# Patient Record
Sex: MALE | Race: WHITE | Employment: PART TIME | URBAN - METROPOLITAN AREA
[De-identification: names, ages, dates, MRNs, and addresses within clinical notes are randomized per-mention and may not be internally consistent; named-entity substitution may affect disease eponyms.]

---

## 2020-11-14 ENCOUNTER — HOSPITAL ENCOUNTER (EMERGENCY)
Age: 20
Discharge: HOME | End: 2020-11-14
Attending: EMERGENCY MEDICINE
Payer: COMMERCIAL

## 2020-11-14 VITALS
DIASTOLIC BLOOD PRESSURE: 96 MMHG | SYSTOLIC BLOOD PRESSURE: 144 MMHG | HEIGHT: 71 IN | BODY MASS INDEX: 19.6 KG/M2 | HEART RATE: 79 BPM | OXYGEN SATURATION: 98 % | WEIGHT: 140 LBS | TEMPERATURE: 97.3 F | RESPIRATION RATE: 14 BRPM

## 2020-11-14 LAB
GLUCOSE BLD-MCNC: 99 MG/DL
GLUCOSE BLD-MCNC: 99 MG/DL (ref 70–99)

## 2020-11-14 PROCEDURE — 93005 ELECTROCARDIOGRAM TRACING: CPT | Performed by: EMERGENCY MEDICINE

## 2020-11-14 PROCEDURE — 82962 GLUCOSE BLOOD TEST: CPT

## 2020-11-14 PROCEDURE — 6370000000 HC RX 637 (ALT 250 FOR IP): Performed by: EMERGENCY MEDICINE

## 2020-11-14 PROCEDURE — 99284 EMERGENCY DEPT VISIT MOD MDM: CPT

## 2020-11-14 RX ORDER — MECLIZINE HYDROCHLORIDE 25 MG/1
25 TABLET ORAL 3 TIMES DAILY PRN
Qty: 15 TABLET | Refills: 0 | Status: SHIPPED | OUTPATIENT
Start: 2020-11-14 | End: 2020-11-24

## 2020-11-14 RX ORDER — MECLIZINE HCL 12.5 MG/1
25 TABLET ORAL ONCE
Status: COMPLETED | OUTPATIENT
Start: 2020-11-14 | End: 2020-11-14

## 2020-11-14 RX ADMIN — MECLIZINE 25 MG: 12.5 TABLET ORAL at 14:27

## 2020-11-14 ASSESSMENT — PAIN SCALES - GENERAL: PAINLEVEL_OUTOF10: 0

## 2020-11-14 NOTE — ED PROVIDER NOTES
Triage Chief Complaint:   Dizziness    [place]:  Sangita Kim is a 23 y.o. male that presents with dizziness. Patient was in baseline state of health until this morning when he woke up getting out of bed and began to feel dizzy. Dizziness is described as a spinning sensation of the room around him. Sensation was initially severe and slowly improved. Dizziness now is only present with abrupt turns of patient's head and improved with resting or holding still. Patient does have a headache. No numbness or weakness. No difficulty with vision. No difficulty with speech or understanding. No recent illnesses. This is the first time this is ever happened. Patient has not taking anything for his symptoms yet. 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 No new hearing difficulties. ROS:  General:  No fevers, no chills, no weakness  Eyes:  No recent vison changes, no discharge  ENT:  No sore throat, no nasal congestion, no hearing changes  Cardiovascular:  No chest pain, no palpitations  Respiratory:  No shortness of breath, no cough, no wheezing  Gastrointestinal:  No pain, no nausea, no vomiting, no diarrhea  Musculoskeletal:  No muscle pain, no joint pain  Skin:  No rash, no pruritis, no easy bruising  Neurologic:  No speech problems, no headache, no extremity numbness, no extremity tingling, no extremity weakness, + dizziness  Psychiatric:  No anxiety  Genitourinary:  No dysuria, no hematuria  Endocrine:  No unexpected weight gain, no unexpected weight loss  Extremities:  no edema, no pain    Past Medical History:   Diagnosis Date    Usher syndrome      No past surgical history on file. No family history on file.   Social History     Socioeconomic History    Marital status: Single     Spouse name: Not on file    Number of children: Not on file    Years of education: Not on file    Highest education level: Not on file   Occupational History    Not on file   Social Needs    Financial resource strain: Not on file    Food insecurity     Worry: Not on file     Inability: Not on file    Transportation needs     Medical: Not on file     Non-medical: Not on file   Tobacco Use    Smoking status: Never Smoker    Smokeless tobacco: Never Used   Substance and Sexual Activity    Alcohol use: Yes     Comment: occasionally    Drug use: Never    Sexual activity: Not on file   Lifestyle    Physical activity     Days per week: Not on file     Minutes per session: Not on file    Stress: Not on file   Relationships    Social connections     Talks on phone: Not on file     Gets together: Not on file     Attends Taoism service: Not on file     Active member of club or organization: Not on file     Attends meetings of clubs or organizations: Not on file     Relationship status: Not on file    Intimate partner violence     Fear of current or ex partner: Not on file     Emotionally abused: Not on file     Physically abused: Not on file     Forced sexual activity: Not on file   Other Topics Concern    Not on file   Social History Narrative    Not on file     No current facility-administered medications for this encounter. Current Outpatient Medications   Medication Sig Dispense Refill    meclizine (ANTIVERT) 25 MG tablet Take 1 tablet by mouth 3 times daily as needed for Dizziness or Nausea 15 tablet 0     Allergies   Allergen Reactions    Amoxicillin Rash       Nursing Notes Reviewed    Physical Exam:  ED Triage Vitals [11/14/20 1249]   Enc Vitals Group      BP (!) 144/96      Pulse 79      Resp 14      Temp 97.3 °F (36.3 °C)      Temp Source Skin      SpO2 98 %      Weight 140 lb (63.5 kg)      Height 5' 11\" (1.803 m)      Head Circumference       Peak Flow       Pain Score       Pain Loc       Pain Edu? Excl. in [address]? My pulse ox interpretation is - normal    General appearance:  No acute distress. Appears well. Very pleasant. Skin:  Warm. Dry.    Eye: Extraocular movements intact. Ears, nose, mouth and throat:  Oral mucosa moist.  Hearing aids present in the left ear. TMs are nonerythematous and nonbulging. Neck:  Trachea midline. No meningismus. Extremity:  No swelling. Normal ROM     Heart:  Regular rate and rhythm, normal S1 & S2, no extra heart sounds. Perfusion:  intact  Respiratory:  Lungs clear to auscultation bilaterally. Respirations nonlabored. Abdominal:  Normal bowel sounds. Soft. Nontender. Non distended. Back:  No CVA tenderness to palpation     Neurological:  Alert and oriented times 3. No focal neuro deficits. NIH Stroke Scale     Interval: Baseline  Time: 8:03 AM  Person Administering Scale: Zachariah Vaz   Administer stroke scale items in the order listed. Record performance in each category after each subscale exam. Do not go back and change scores. Follow directions provided for each exam technique. Scores should reflect what the patient does, not what the clinician thinks the patient can do. The clinician should record answers while administering the exam and work quickly. Except where indicated, the patient should not be coached (i.e., repeated requests to patient to make a special effort). 1a  Level of consciousness: 0=alert; keenly responsive   1b. LOC questions:  0=Performs both tasks correctly   1c. LOC commands: 0=Performs both tasks correctly   2. Best Gaze: 0=normal   3. Visual: 0=No visual loss   4. Facial Palsy: 0=Normal symmetric movement   5a. Motor left arm: 0=No drift, limb holds 90 (or 45) degrees for full 10 seconds   5b. Motor right arm: 0=No drift, limb holds 90 (or 45) degrees for full 10 seconds   6a. motor left le=No drift, limb holds 90 (or 45) degrees for full 10 seconds   6b  Motor right le=No drift, limb holds 90 (or 45) degrees for full 10 seconds   7. Limb Ataxia: 0=Absent   8. Sensory: 0=Normal; no sensory loss   9. Best Language:  0=No aphasia, normal   10.  Dysarthria: 0=Normal   11. Extinction and Inattention: 0=No abnormality   12. Distal motor function: 0=Normal    Total:  0                 Psychiatric:  Appropriate    I have reviewed and interpreted all of the currently available lab results from this visit (if applicable):  Results for orders placed or performed during the hospital encounter of 11/14/20   POC Glucose   Result Value Ref Range    Glucose 99 mg/dL   POCT Glucose   Result Value Ref Range    POC Glucose 99 70 - 99 MG/DL   EKG 12 Lead   Result Value Ref Range    Ventricular Rate 63 BPM    Atrial Rate 63 BPM    P-R Interval 132 ms    QRS Duration 100 ms    Q-T Interval 394 ms    QTc Calculation (Bazett) 403 ms    P Axis 59 degrees    R Axis 76 degrees    T Axis 41 degrees    Diagnosis       Normal sinus rhythm  Normal ECG  No previous ECGs available        Radiographs (if obtained):  [] The following radiograph was interpreted by myself in the absence of a radiologist:   [] Radiologist's Report Reviewed:  No orders to display         EKG (if obtained): (All EKG's are interpreted by myself in the absence of a cardiologist)  12 lead EKG per my interpretation:  Normal Sinus Rhythm at 63  Axis is   Normal  QTc is  normal  There is no specific T wave changes appreciated. There is no specific ST wave changes appreciated. No STEMI    Prior EKG to compare with was not available. Chart review shows recent radiographs:  No results found. MDM:  Pt presents as above. Emergent conditions considered. Presentation prompted initial point-of-care glucose and EKG. EKG with a normal sinus rhythm as above. Point-of-care glucose with euglycemia. Presentation consistent with a BPPV and patient is treated with meclizine and oral rehydration. Patient is currently asymptomatic and is even having difficulties reproducing the dizziness with abrupt movements of his head in the emergency department.   Patient is with nonfocal neurologic exam and an NIH of 0; doubt CVA or central process. I did discuss the likely diagnosis with the patient as well as described the Epley maneuver and provided information regarding this. I will prescribe a course of meclizine for home and encouraged patient to take should he become symptomatic. Encouraged primary care follow-up. I discussed specific signs and symptoms on when to return to the emergency department as well as the need for close outpatient follow-up. Questions sought and answered with the patient. They voice understanding and agree with plan. Clinical Impression:  1. Benign paroxysmal positional vertigo, unspecified laterality      Disposition referral (if applicable): Your Primary Care Physician    Schedule an appointment as soon as possible for a visit   [address] [facility]  [address]  [phone]  Today  If symptoms worsen    Disposition medications (if applicable):  Discharge Medication List as of 11/14/2020  2:50 PM      START taking these medications    Details   meclizine (ANTIVERT) 25 MG tablet Take 1 tablet by mouth 3 times daily as needed for Dizziness or Nausea, Disp-15 tablet,R-0Print             Comment: Please note this report has been produced using speech recognition software and may contain errors related to that system including errors in grammar, punctuation, and spelling, as well as words and phrases that may be inappropriate. If there are any questions or concerns please feel free to contact the dictating provider for clarification.        Carmen Elise MD  11/15/20 [id number]

## 2020-11-14 NOTE — ED TRIAGE NOTES
Pt arrives with complaints of waking up dizzy this am, denies any pain, symptoms started today, has been slightly nauseated, denies any fever or chills, no shortness of breath, pt reports that this dizziness has subsided slightly but comes and goes since this am.

## 2020-11-15 PROCEDURE — 93010 ELECTROCARDIOGRAM REPORT: CPT | Performed by: INTERNAL MEDICINE

## 2020-11-17 LAB
EKG ATRIAL RATE: 63 BPM
EKG DIAGNOSIS: NORMAL
EKG P AXIS: 59 DEGREES
EKG P-R INTERVAL: 132 MS
EKG Q-T INTERVAL: 394 MS
EKG QRS DURATION: 100 MS
EKG QTC CALCULATION (BAZETT): 403 MS
EKG R AXIS: 76 DEGREES
EKG T AXIS: 41 DEGREES
EKG VENTRICULAR RATE: 63 BPM